# Patient Record
Sex: FEMALE | Race: WHITE | NOT HISPANIC OR LATINO | Employment: FULL TIME | URBAN - METROPOLITAN AREA
[De-identification: names, ages, dates, MRNs, and addresses within clinical notes are randomized per-mention and may not be internally consistent; named-entity substitution may affect disease eponyms.]

---

## 2022-12-02 ENCOUNTER — TRANSFERRED RECORDS (OUTPATIENT)
Dept: MULTI SPECIALTY CLINIC | Facility: CLINIC | Age: 27
End: 2022-12-02

## 2022-12-02 LAB — PAP-ABSTRACT: NORMAL

## 2023-09-15 ENCOUNTER — TRANSFERRED RECORDS (OUTPATIENT)
Dept: HEALTH INFORMATION MANAGEMENT | Facility: CLINIC | Age: 28
End: 2023-09-15

## 2024-05-19 ENCOUNTER — HEALTH MAINTENANCE LETTER (OUTPATIENT)
Age: 29
End: 2024-05-19

## 2024-06-04 SDOH — HEALTH STABILITY: PHYSICAL HEALTH: ON AVERAGE, HOW MANY DAYS PER WEEK DO YOU ENGAGE IN MODERATE TO STRENUOUS EXERCISE (LIKE A BRISK WALK)?: 1 DAY

## 2024-06-04 SDOH — HEALTH STABILITY: PHYSICAL HEALTH: ON AVERAGE, HOW MANY MINUTES DO YOU ENGAGE IN EXERCISE AT THIS LEVEL?: 20 MIN

## 2024-06-04 ASSESSMENT — SOCIAL DETERMINANTS OF HEALTH (SDOH): HOW OFTEN DO YOU GET TOGETHER WITH FRIENDS OR RELATIVES?: ONCE A WEEK

## 2024-06-05 ENCOUNTER — OFFICE VISIT (OUTPATIENT)
Dept: PEDIATRICS | Facility: CLINIC | Age: 29
End: 2024-06-05
Payer: COMMERCIAL

## 2024-06-05 VITALS
HEART RATE: 89 BPM | RESPIRATION RATE: 16 BRPM | BODY MASS INDEX: 24.1 KG/M2 | WEIGHT: 159 LBS | SYSTOLIC BLOOD PRESSURE: 108 MMHG | DIASTOLIC BLOOD PRESSURE: 60 MMHG | HEIGHT: 68 IN | OXYGEN SATURATION: 99 % | TEMPERATURE: 97.2 F

## 2024-06-05 DIAGNOSIS — F90.0 ATTENTION DEFICIT HYPERACTIVITY DISORDER (ADHD), PREDOMINANTLY INATTENTIVE TYPE: ICD-10-CM

## 2024-06-05 DIAGNOSIS — F32.A DEPRESSION, UNSPECIFIED DEPRESSION TYPE: ICD-10-CM

## 2024-06-05 DIAGNOSIS — Z00.00 ENCOUNTER FOR PREVENTATIVE ADULT HEALTH CARE EXAMINATION: Primary | ICD-10-CM

## 2024-06-05 DIAGNOSIS — Z76.89 ENCOUNTER TO ESTABLISH CARE: ICD-10-CM

## 2024-06-05 DIAGNOSIS — F41.9 ANXIETY: ICD-10-CM

## 2024-06-05 DIAGNOSIS — Z11.59 NEED FOR HEPATITIS C SCREENING TEST: ICD-10-CM

## 2024-06-05 DIAGNOSIS — L70.0 ACNE VULGARIS: ICD-10-CM

## 2024-06-05 DIAGNOSIS — Z11.4 SCREENING FOR HIV (HUMAN IMMUNODEFICIENCY VIRUS): ICD-10-CM

## 2024-06-05 DIAGNOSIS — E03.9 HYPOTHYROIDISM, UNSPECIFIED TYPE: ICD-10-CM

## 2024-06-05 PROCEDURE — 99385 PREV VISIT NEW AGE 18-39: CPT | Performed by: NURSE PRACTITIONER

## 2024-06-05 PROCEDURE — 99214 OFFICE O/P EST MOD 30 MIN: CPT | Mod: 25 | Performed by: NURSE PRACTITIONER

## 2024-06-05 RX ORDER — SPIRONOLACTONE 50 MG/1
50 TABLET, FILM COATED ORAL 2 TIMES DAILY
COMMUNITY
Start: 2022-09-15

## 2024-06-05 RX ORDER — ESCITALOPRAM OXALATE 10 MG/1
10 TABLET ORAL DAILY
Qty: 90 TABLET | Refills: 3 | Status: SHIPPED | OUTPATIENT
Start: 2024-06-05 | End: 2024-08-15

## 2024-06-05 RX ORDER — ESCITALOPRAM OXALATE 10 MG/1
10 TABLET ORAL DAILY
COMMUNITY
End: 2024-06-05

## 2024-06-05 RX ORDER — DESOGESTREL AND ETHINYL ESTRADIOL 0.15-0.03
1 KIT ORAL DAILY
COMMUNITY
End: 2024-06-05

## 2024-06-05 RX ORDER — DESOGESTREL AND ETHINYL ESTRADIOL 0.15-0.03
1 KIT ORAL DAILY
Qty: 84 TABLET | Refills: 3 | Status: SHIPPED | OUTPATIENT
Start: 2024-06-05 | End: 2024-06-13

## 2024-06-05 RX ORDER — DESOGESTREL AND ETHINYL ESTRADIOL 0.15-0.03
1 KIT ORAL DAILY
Qty: 84 TABLET | Refills: 3 | Status: SHIPPED | OUTPATIENT
Start: 2024-06-05 | End: 2024-06-05

## 2024-06-05 ASSESSMENT — PAIN SCALES - GENERAL: PAINLEVEL: NO PAIN (0)

## 2024-06-05 NOTE — PROGRESS NOTES
Preventive Care Visit  [facility] JEREMÍAS Farr CNP, Family Medicine  Jun 5, 2024      Assessment & Plan     Encounter for preventative adult health care examination  Age appropriate screening and preventative care have been addressed today. Vaccinations have been reviewed, she will return at later date for the COVID-19 vaccine. Patient has been advised to follow a balanced diet. They have been advised to undertake routine aerobic activity. Recommend annual vision exams as well as biannual dental exams. They will follow up for annual physical again in one year.   - Lipid panel reflex to direct LDL Fasting; Future  - Glucose; Future  - Pap utd, done through [facility] in 2022, results normal.     Encounter to establish care  Histories and medications reviewed and updated.   - OFFICE/OUTPT VISIT,EST,LEVL IV     Screening for HIV (human immunodeficiency virus)  - HIV Antigen Antibody Combo; Future    Need for hepatitis C screening test  - Hepatitis C Screen Reflex to HCV RNA Quant and Genotype; Future    Hypothyroidism, unspecified type  Off medication since January. Recheck TSH/T4 today and start medication as indicated. Prior dose was 75 mcg daily.   - TSH with free T4 reflex; Future  - OFFICE/OUTPT VISIT,EST,LEVL IV     Acne vulgaris  Chronic, stable. Taking combined oral contraceptives and spironolactone for management, spironolactone prescribed by derm.   - desogestrel-ethinyl estradiol (APRI) 0.15-30 MG-MCG tablet; Take 1 tablet by mouth daily  - OFFICE/OUTPT VISIT,EST,LEVL IV     Anxiety  Depression, unspecified depression type  Chronic, stable. Taking medication as prescribed. Issues with sleep, trouble initiating sleep, mind rolls and hard time turning off thoughts.   - escitalopram (LEXAPRO) 10 MG tablet; Take 1 tablet (10 mg) by mouth daily  - OFFICE/OUTPT VISIT,EST,LEVL IV     Attention deficit hyperactivity disorder (ADHD), predominantly inattentive type  ADHD testing done  9/2023, brought in copy of her assessment. Isn't sure if she would like to start medications but interested to learn more. Will have her return for virtual visit.   - OFFICE/OUTPT VISIT,CHERELLE KENNEY IV             Counseling  Appropriate preventive services were discussed with this patient, including applicable screening as appropriate for fall prevention, nutrition, physical activity, Tobacco-use cessation, weight loss and cognition.  Checklist reviewing preventive services available has been given to the patient.  Reviewed patient's diet, addressing concerns and/or questions.   She is at risk for lack of exercise and has been provided with information to increase physical activity for the benefit of her well-being.   The patient was instructed to see the dentist every 6 months.       Zoe Yoon is a 28 year old, presenting for the following:  Physical        6/5/2024     7:53 AM   Additional Questions   Roomed by Renee JEREZ CMA   Accompanied by Self         6/5/2024     7:53 AM   Patient Reported Additional Medications   Patient reports taking the following new medications n/a        Health Care Directive  Patient does not have a Health Care Directive or Living Will: Discussed advance care planning with patient; however, patient declined at this time.    HPI      Was on levothyroxine until January of this year, then stopped because she ran out. Insurance wouldn't allow her to go back to [place]. Was on levothyroxine 75 mcg daily. Hasn't had labs checked since 12/2022. Always tired. Naturally runs anxious. On lexapro.     Anxiety - on lexapro and seeing therapist every 2 weeks.     Spironolactone through derm for acne.     WILLIAMS, started for acne. Takes continuously.     Pap - done though [facility] 12/2022, results normal.     ADHD testing done 9/2023. Isn't sure if she would like to start medications but interested to learn more.         6/4/2024   General Health   How would you rate your overall physical  health? (!) FAIR   Feel stress (tense, anxious, or unable to sleep) To some extent   (!) STRESS CONCERN      6/4/2024   Nutrition   Three or more servings of calcium each day? (!) NO   Diet: Other   If other, please elaborate: Lactose Intolerant so I limit dairy   How many servings of fruit and vegetables per day? (!) 0-1   How many sweetened beverages each day? 0-1         6/4/2024   Exercise   Days per week of moderate/strenous exercise 1 day   Average minutes spent exercising at this level 20 min   (!) EXERCISE CONCERN      6/4/2024   Social Factors   Frequency of gathering with friends or relatives Once a week   Worry food won't last until get money to buy more No   Food not last or not have enough money for food? No   Do you have housing?  Yes   Are you worried about losing your housing? No   Lack of transportation? No   Unable to get utilities (heat,electricity)? No         6/4/2024   Dental   Dentist two times every year? (!) NO         6/4/2024   TB Screening   Were you born outside of the [place]? No         Today's PHQ-2 Score:       6/4/2024     3:41 PM   PHQ-2 ( 1999 [employer])   Q1: Little interest or pleasure in doing things 1   Q2: Feeling down, depressed or hopeless 1   PHQ-2 Score 2   Q1: Little interest or pleasure in doing things Several days   Q2: Feeling down, depressed or hopeless Several days   PHQ-2 Score 2           6/4/2024   Substance Use   Alcohol more than 3/day or more than 7/wk No   Do you use any other substances recreationally? No     Social History     Tobacco Use    Smoking status: Never     Passive exposure: Never    Smokeless tobacco: Never   Vaping Use    Vaping status: Never Used   Substance Use Topics    Alcohol use: Not Currently     Comment: If I had to guess I have a drink maybe every 3 months or so.    Drug use: Never          Mammogram Screening - Patient under 40 years of age: Routine Mammogram Screening not recommended.           6/4/2024   One time HIV Screening   Previous HIV  "test? No         6/4/2024   STI Screening   New sexual partner(s) since last STI/HIV test? No     History of abnormal Pap smear: No - age 21-29 PAP every 3 years recommended             6/4/2024   Contraception/Family Planning   Questions about contraception or family planning No        Reviewed and updated as needed this visit by Provider                    There is no problem list on file for this patient.    Past Surgical History:   Procedure Laterality Date    MOUTH SURGERY         Social History     Tobacco Use    Smoking status: Never     Passive exposure: Never    Smokeless tobacco: Never   Substance Use Topics    Alcohol use: Not Currently     Comment: If I had to guess I have a drink maybe every 3 months or so.     Family History   Problem Relation Age of Onset    Anxiety Disorder Mother     Hypertension Father     Substance Abuse Father     Diabetes Maternal Grandfather     Cerebrovascular Disease Maternal Grandfather     Other Cancer Maternal Grandfather     Thyroid Disease Maternal Grandmother     Anxiety Disorder Sister                 Objective    Exam  /60   Pulse 89   Temp 97.2  F (36.2  C) (Tympanic)   Resp 16   Ht 1.715 m (5' 7.5\")   Wt 72.1 kg (159 lb)   LMP 02/29/2024 (Approximate)   SpO2 99%   BMI 24.54 kg/m     Estimated body mass index is 24.54 kg/m  as calculated from the following:    Height as of this encounter: 1.715 m (5' 7.5\").    Weight as of this encounter: 72.1 kg (159 lb).    Physical Exam  Constitutional: appears to be in no acute distress, comfortable, pleasant.   Eyes: anicteric, conjunctiva clear without drainage or erythema. J CARLOS.   Ears, Nose and Throat: tympanic membranes gray with LR,  nose without nasal discharge. OP: no erythema to posterior pharynx, negative post nasal drainage, tonsils +1 no erythema or exudate.  Neck: supple, thyroid palpable,not enlarged, no nodules   Breast: Exam deferred (deferred after discussion of exam options with patient, no " symptoms or concerns).   Cardiovascular: regular rate and rhythm, normal S1 and S2, no murmurs, rubs or gallops, peripheral pulses full and symmetric; negative peripheral edema   Respiratory: Air entry throughout. Breathing pattern unlabored without the use of accessory muscles. Clear to auscultation A and P, no wheezes or crackles, normal breath sounds.    Gastrointestinal: rounded, soft. Positive bowel sounds x4, nontender, no masses.   Genitourinary: Exam deferred (deferred after discussion of exam options with patient, no symptoms or concerns, pap is up to date).   Musculoskeletal: full range of motion, no edema.   Skin: pink, turgor smooth and elastic. Negative for lesions or dryness.  Neurological: normal gait, no tremor.   Psychological: appropriate mood and affect.   Lymphatic: no cervical, axillary, supraclavicular, or infraclavicular lymphadenopathy.    Signed Electronically by: JEREMÍAS Leone CNP

## 2024-06-13 ENCOUNTER — TELEPHONE (OUTPATIENT)
Dept: PEDIATRICS | Facility: CLINIC | Age: 29
End: 2024-06-13

## 2024-06-13 ENCOUNTER — VIRTUAL VISIT (OUTPATIENT)
Dept: PEDIATRICS | Facility: CLINIC | Age: 29
End: 2024-06-13
Payer: COMMERCIAL

## 2024-06-13 ENCOUNTER — MYC MEDICAL ADVICE (OUTPATIENT)
Dept: PEDIATRICS | Facility: CLINIC | Age: 29
End: 2024-06-13

## 2024-06-13 DIAGNOSIS — F90.0 ATTENTION DEFICIT HYPERACTIVITY DISORDER (ADHD), PREDOMINANTLY INATTENTIVE TYPE: Primary | ICD-10-CM

## 2024-06-13 DIAGNOSIS — L70.0 ACNE VULGARIS: ICD-10-CM

## 2024-06-13 DIAGNOSIS — E03.8 SUBCLINICAL HYPOTHYROIDISM: Primary | ICD-10-CM

## 2024-06-13 PROCEDURE — 99214 OFFICE O/P EST MOD 30 MIN: CPT | Mod: 95 | Performed by: NURSE PRACTITIONER

## 2024-06-13 RX ORDER — DESOGESTREL AND ETHINYL ESTRADIOL 0.15-0.03
1 KIT ORAL DAILY
Qty: 84 TABLET | Refills: 3 | Status: SHIPPED | OUTPATIENT
Start: 2024-06-13

## 2024-06-13 RX ORDER — DEXTROAMPHETAMINE SACCHARATE, AMPHETAMINE ASPARTATE MONOHYDRATE, DEXTROAMPHETAMINE SULFATE AND AMPHETAMINE SULFATE 2.5; 2.5; 2.5; 2.5 MG/1; MG/1; MG/1; MG/1
10 CAPSULE, EXTENDED RELEASE ORAL DAILY
Qty: 30 CAPSULE | Refills: 0 | Status: SHIPPED | OUTPATIENT
Start: 2024-06-13 | End: 2024-07-18

## 2024-06-13 NOTE — TELEPHONE ENCOUNTER
"From Melissa Geiger APRN CNP  \"Hi! Could you please call her pharmacy and figure out what how I need to write her birth control prescription in order for her to get a refill? She takes it continuously so runs out sooner and they won't give her a refill for another 2 weeks. She is out of medication. Do I need to change the quantity? I wrote them a note with her last refill saying it was okay to fill early and they still wouldn't dispense it. Let me know what you find out. Thanks!\"     RN called and spoke with pharmacy staff. Pharmacy states she can fill tomorrow with insurance coverage.   Pharmacy states that someone can call insurance and inform them that patient skips her placebo week of medication so she takes continuously, that is why she needs early refills- if script is changed- we should not have to contact insurance.    RN attempted to reach patient to inform her prescription can be picked up tomorrow. LVMTCB and speak with nurse Torres-  Khari HERNANDEZ; should be written \"Take 1 tablet by mouth daily continuously, skip placebo week. \"  Write for 3 packs (or 84 tablets) and pharmacy can then adjust the amount of days the prescription is written for for insurance purpose.    I think you also should be able to adjust the quantity to 112- whichever you prefer!    Hope this helps!  Anita HUNTER RN on 6/13/2024 at 12:41 PM     "

## 2024-06-13 NOTE — PROGRESS NOTES
Car is a 28 year old who is being evaluated via a billable video visit.          Assessment & Plan     Attention deficit hyperactivity disorder (ADHD), predominantly inattentive type  Start Adderall XR 10 mg daily in the morning. Opted out of afternoon booster dose at this time, will consider in the future if needed. Follow-up in 1 month, in person so that we can complete a controlled substance agreement and urine test.  - amphetamine-dextroamphetamine (ADDERALL XR) 10 MG 24 hr capsule; Take 1 capsule (10 mg) by mouth daily                Subjective   Car is a 28 year old, presenting for the following health issues:  A.D.H.D    HPI     Presents today for ADHD follow-up.     ADHD testing completed 9/2023 and confirmed a diagnosis of ADHD, predominantly inattentive type. A copy of this assessment has been scanned into her chart.     She is interested in learning more about treatment options, specifically pharmacologic management options. She is currently following with her therapist every 2 weeks.         Objective       Vitals:  No vitals were obtained today due to virtual visit.    Physical Exam   GENERAL: alert and no distress  EYES: Eyes grossly normal to inspection.  No discharge or erythema, or obvious scleral/conjunctival abnormalities.  RESP: No audible wheeze, cough, or visible cyanosis.    SKIN: Visible skin clear. No significant rash, abnormal pigmentation or lesions.  NEURO: Cranial nerves grossly intact.  Mentation and speech appropriate for age.  PSYCH: Appropriate affect, tone, and pace of words        Video-Visit Details    Type of service:  Video Visit   Originating Location (pt. Location): Home    Distant Location (provider location):  Off-site  Platform used for Video Visit: Prabhjot  Signed Electronically by: JEREMÍAS Leone CNP

## 2024-06-13 NOTE — Clinical Note
Hi! Could you please call her pharmacy and figure out what how I need to write her birth control prescription in order for her to get a refill? She takes it continuously so runs out sooner and they won't give her a refill for another 2 weeks. She is out of medication. Do I need to change the quantity? I wrote them a note with her last refill saying it was okay to fill early and they still wouldn't dispense it. Let me know what you find out. Thanks!

## 2024-06-14 ENCOUNTER — LAB (OUTPATIENT)
Dept: LAB | Facility: CLINIC | Age: 29
End: 2024-06-14
Payer: COMMERCIAL

## 2024-06-14 DIAGNOSIS — Z00.00 ENCOUNTER FOR PREVENTATIVE ADULT HEALTH CARE EXAMINATION: ICD-10-CM

## 2024-06-14 DIAGNOSIS — E03.8 SUBCLINICAL HYPOTHYROIDISM: ICD-10-CM

## 2024-06-14 DIAGNOSIS — E03.9 HYPOTHYROIDISM, UNSPECIFIED TYPE: ICD-10-CM

## 2024-06-14 DIAGNOSIS — Z11.59 NEED FOR HEPATITIS C SCREENING TEST: ICD-10-CM

## 2024-06-14 DIAGNOSIS — Z11.4 SCREENING FOR HIV (HUMAN IMMUNODEFICIENCY VIRUS): ICD-10-CM

## 2024-06-14 LAB
CHOLEST SERPL-MCNC: 267 MG/DL
FASTING STATUS PATIENT QL REPORTED: ABNORMAL
FASTING STATUS PATIENT QL REPORTED: NORMAL
GLUCOSE SERPL-MCNC: 77 MG/DL (ref 70–99)
HCV AB SERPL QL IA: NONREACTIVE
HDLC SERPL-MCNC: 59 MG/DL
HIV 1+2 AB+HIV1 P24 AG SERPL QL IA: NONREACTIVE
LDLC SERPL CALC-MCNC: 187 MG/DL
NONHDLC SERPL-MCNC: 208 MG/DL
T4 FREE SERPL-MCNC: 1.04 NG/DL (ref 0.9–1.7)
TRIGL SERPL-MCNC: 107 MG/DL
TSH SERPL DL<=0.005 MIU/L-ACNC: 6.04 UIU/ML (ref 0.3–4.2)

## 2024-06-14 PROCEDURE — 82947 ASSAY GLUCOSE BLOOD QUANT: CPT

## 2024-06-14 PROCEDURE — 84443 ASSAY THYROID STIM HORMONE: CPT

## 2024-06-14 PROCEDURE — 87389 HIV-1 AG W/HIV-1&-2 AB AG IA: CPT

## 2024-06-14 PROCEDURE — 86803 HEPATITIS C AB TEST: CPT

## 2024-06-14 PROCEDURE — 36415 COLL VENOUS BLD VENIPUNCTURE: CPT

## 2024-06-14 PROCEDURE — 86376 MICROSOMAL ANTIBODY EACH: CPT

## 2024-06-14 PROCEDURE — 80061 LIPID PANEL: CPT

## 2024-06-14 PROCEDURE — 84439 ASSAY OF FREE THYROXINE: CPT

## 2024-06-18 LAB — THYROPEROXIDASE AB SERPL-ACNC: 14 IU/ML

## 2024-06-19 DIAGNOSIS — E03.8 SUBCLINICAL HYPOTHYROIDISM: Primary | ICD-10-CM

## 2024-06-20 ENCOUNTER — MYC MEDICAL ADVICE (OUTPATIENT)
Dept: PEDIATRICS | Facility: CLINIC | Age: 29
End: 2024-06-20
Payer: COMMERCIAL

## 2024-06-21 NOTE — TELEPHONE ENCOUNTER
"See patient's BTC Tript message   - Patient wondering if she can travel through [facility]/airport with her Adderall medication     Sent a BTC Tript message to the patient   - Informed the patient of the [facility] website information stating:     \"Can you pack your meds in a pill case and more questions answered  One of the more popular questions we get from travelers is:  Can I travel with my medication?  The answer is yes, with some qualifiers. Here are a few tips that you might find helpful.    It is not necessary to present your medication to, or notify an officer about any medication you are traveling with unless it is in liquid form (See next bullet).  Medication in liquid form is allowed in carry-on bags in excess of 3.4 ounces in reasonable quantities for the flight. It is not necessary to place medically required liquids in a zip-top bag. However, you must tell the officer that you have medically necessary liquids at the start of the screening checkpoint process. Medically required liquids will be subject to additional screening that could include being asked to open the container.  You can bring your medication in pill or solid form in unlimited amounts as long as it is screened.  You can travel with your medication in both carry-on and checked baggage. It s highly recommended you place these items in your carry-on in the event that you need immediate access.  [facility] does not require passengers to have medications in prescription bottles, but states have individual laws regarding the labeling of prescription medication with which passengers need to comply.  Medication is usually screened by X-ray; however, if a passenger does not want a medication X-rayed, he or she may ask for a visual inspection instead. This request must be made before any items are sent through the X-ray tunnel.  Nitroglycerin tablets and spray (used to treat episodes of angina in people who have coronary artery disease) are permitted and have never been " "prohibited.\"     Eliza SAMUEL RN   [facility] [place]   "

## 2024-07-18 ENCOUNTER — VIRTUAL VISIT (OUTPATIENT)
Dept: PEDIATRICS | Facility: CLINIC | Age: 29
End: 2024-07-18
Payer: COMMERCIAL

## 2024-07-18 DIAGNOSIS — F90.0 ATTENTION DEFICIT HYPERACTIVITY DISORDER (ADHD), PREDOMINANTLY INATTENTIVE TYPE: Primary | ICD-10-CM

## 2024-07-18 PROCEDURE — 99214 OFFICE O/P EST MOD 30 MIN: CPT | Mod: 95 | Performed by: NURSE PRACTITIONER

## 2024-07-18 PROCEDURE — G2211 COMPLEX E/M VISIT ADD ON: HCPCS | Mod: 95 | Performed by: NURSE PRACTITIONER

## 2024-07-18 RX ORDER — DEXTROAMPHETAMINE SACCHARATE, AMPHETAMINE ASPARTATE MONOHYDRATE, DEXTROAMPHETAMINE SULFATE AND AMPHETAMINE SULFATE 3.75; 3.75; 3.75; 3.75 MG/1; MG/1; MG/1; MG/1
15 CAPSULE, EXTENDED RELEASE ORAL DAILY
Qty: 30 CAPSULE | Refills: 0 | Status: SHIPPED | OUTPATIENT
Start: 2024-07-18 | End: 2024-08-15

## 2024-07-18 NOTE — LETTER
July 18, 2024      Car Luis  [address] [place] [address]        To Whom It May Concern,     Car Luis is under my care. She has an active prescription for Adderall XR and it can be expected that she will be traveling internationally with this medication on hand given she is instructed to take this every morning as prescribed.     Please contact me with questions or concerns.       Sincerely,    JEREMÍAS Leone CNP

## 2024-07-18 NOTE — PROGRESS NOTES
Car is a 29 year old who is being evaluated via a billable video visit.          Assessment & Plan     Attention deficit hyperactivity disorder (ADHD), predominantly inattentive type  No improvement after starting Adderall XR 10 mg daily about a month ago. Plan to increase to Adderall XR 15 mg daily with vrt follow-up again in 1 month, sooner if she has concerns.   - amphetamine-dextroamphetamine (ADDERALL XR) 15 MG 24 hr capsule; Take 1 capsule (15 mg) by mouth daily  [[id number]] ND COMPLEX E & M VISIT, PART OF ON-GOING CARE (ADD ON)     The longitudinal plan of care for the diagnosis(es)/condition(s) as documented were addressed during this visit. Due to the added complexity in care, I will continue to support Car in the subsequent management and with ongoing continuity of care.      Subjective   Car is a 29 year old, presenting for the following health issues:  EARNESTINE    HPI     Presents today for ADHD follow-up.     Started Adderall XR 10 mg daily about a month ago. She is taking the medication as prescribed. She denies side effects. She hasn't noticed any difference in her ADHD symptoms since starting the Adderall.         Objective       Vitals:  No vitals were obtained today due to virtual visit.    Physical Exam   GENERAL: alert and no distress  EYES: Eyes grossly normal to inspection.  No discharge or erythema, or obvious scleral/conjunctival abnormalities.  RESP: No audible wheeze, cough, or visible cyanosis.    SKIN: Visible skin clear. No significant rash, abnormal pigmentation or lesions.  NEURO: Cranial nerves grossly intact.  Mentation and speech appropriate for age.  PSYCH: Appropriate affect, tone, and pace of words          Video-Visit Details    Type of service:  Video Visit   Originating Location (pt. Location): Home    Distant Location (provider location):  Off-site  Platform used for Video Visit: Prabhjot  Signed Electronically by: JEREMÍAS Leone CNP

## 2024-08-14 ENCOUNTER — MYC REFILL (OUTPATIENT)
Dept: PEDIATRICS | Facility: CLINIC | Age: 29
End: 2024-08-14
Payer: COMMERCIAL

## 2024-08-14 DIAGNOSIS — F41.9 ANXIETY: ICD-10-CM

## 2024-08-14 DIAGNOSIS — F32.A DEPRESSION, UNSPECIFIED DEPRESSION TYPE: ICD-10-CM

## 2024-08-14 RX ORDER — ESCITALOPRAM OXALATE 10 MG/1
10 TABLET ORAL DAILY
Qty: 90 TABLET | Refills: 3 | OUTPATIENT
Start: 2024-08-14

## 2024-08-15 ENCOUNTER — VIRTUAL VISIT (OUTPATIENT)
Dept: PEDIATRICS | Facility: CLINIC | Age: 29
End: 2024-08-15
Payer: COMMERCIAL

## 2024-08-15 DIAGNOSIS — F41.9 ANXIETY: ICD-10-CM

## 2024-08-15 DIAGNOSIS — F32.A DEPRESSION, UNSPECIFIED DEPRESSION TYPE: ICD-10-CM

## 2024-08-15 DIAGNOSIS — F90.0 ATTENTION DEFICIT HYPERACTIVITY DISORDER (ADHD), PREDOMINANTLY INATTENTIVE TYPE: Primary | ICD-10-CM

## 2024-08-15 PROCEDURE — 99214 OFFICE O/P EST MOD 30 MIN: CPT | Mod: 95 | Performed by: NURSE PRACTITIONER

## 2024-08-15 PROCEDURE — G2211 COMPLEX E/M VISIT ADD ON: HCPCS | Mod: 95 | Performed by: NURSE PRACTITIONER

## 2024-08-15 RX ORDER — ESCITALOPRAM OXALATE 10 MG/1
10 TABLET ORAL DAILY
Qty: 90 TABLET | Refills: 3 | Status: SHIPPED | OUTPATIENT
Start: 2024-08-15

## 2024-08-15 RX ORDER — DEXTROAMPHETAMINE SACCHARATE, AMPHETAMINE ASPARTATE MONOHYDRATE, DEXTROAMPHETAMINE SULFATE AND AMPHETAMINE SULFATE 5; 5; 5; 5 MG/1; MG/1; MG/1; MG/1
20 CAPSULE, EXTENDED RELEASE ORAL DAILY
Qty: 30 CAPSULE | Refills: 0 | Status: SHIPPED | OUTPATIENT
Start: 2024-08-15 | End: 2024-09-26

## 2024-08-15 NOTE — PROGRESS NOTES
Car is a 29 year old who is being evaluated via a billable video visit.          Assessment & Plan     Attention deficit hyperactivity disorder (ADHD), predominantly inattentive type  No change. Increase to Adderall XR 20 mg daily with vrt follow-up again in 1 month. Future considerations: switch to IR formulation or switch to alternate stimulant.   - amphetamine-dextroamphetamine (ADDERALL XR) 20 MG 24 hr capsule; Take 1 capsule (20 mg) by mouth daily    Anxiety  Depression, unspecified depression type  Having issues getting her lexapro from the pharmacy so the prescription was re-sent. No concerns with regard to anxiety or depression.   - escitalopram (LEXAPRO) 10 MG tablet; Take 1 tablet (10 mg) by mouth daily    The longitudinal plan of care for the diagnosis(es)/condition(s) as documented were addressed during this visit. Due to the added complexity in care, I will continue to support Car in the subsequent management and with ongoing continuity of care.      Subjective   Car is a 29 year old, presenting for the following health issues:  Recheck Medication    HPI     Presents today for medication recheck.     Last OV 7/18/2024:  Attention deficit hyperactivity disorder (ADHD), predominantly inattentive type  No improvement after starting Adderall XR 10 mg daily about a month ago. Plan to increase to Adderall XR 15 mg daily with vrt follow-up again in 1 month, sooner if she has concerns.     Has still not noticed a difference in her ADHD symptoms on the 15 mg dose. She is taking the medication 7 days a week, admits to forgetting 1x/week on some weeks.         Objective       Vitals:  No vitals were obtained today due to virtual visit.    Physical Exam   GENERAL: alert and no distress  EYES: Eyes grossly normal to inspection.  No discharge or erythema, or obvious scleral/conjunctival abnormalities.  RESP: No audible wheeze, cough, or visible cyanosis.    SKIN: Visible skin clear. No significant rash,  abnormal pigmentation or lesions.  NEURO: Cranial nerves grossly intact.  Mentation and speech appropriate for age.  PSYCH: Appropriate affect, tone, and pace of words        Video-Visit Details    Type of service:  Video Visit   Originating Location (pt. Location): Home    Distant Location (provider location):  Off-site  Platform used for Video Visit: Prabhjot  Signed Electronically by: JEREMÍAS Leone CNP

## 2024-08-19 ENCOUNTER — MYC MEDICAL ADVICE (OUTPATIENT)
Dept: PEDIATRICS | Facility: CLINIC | Age: 29
End: 2024-08-19
Payer: COMMERCIAL

## 2024-08-20 NOTE — TELEPHONE ENCOUNTER
"Please review mychart message. Patient has concerns about effects of Adderall after dose increase.    \"I have been feeling super out of it, in a haze almost.\"    Patient inquiring if she should go back down to the 15mg dose.    Attention deficit hyperactivity disorder (ADHD), predominantly inattentive type  No change. Increase to Adderall XR 20 mg daily with vrt follow-up again in 1 month. Future considerations: switch to IR formulation or switch to alternate stimulant.   - amphetamine-dextroamphetamine (ADDERALL XR) 20 MG 24 hr capsule; Take 1 capsule (20 mg) by mouth daily    Please advise.  Thanks!  Ruth NOLAN RN, BSN             "

## 2024-09-26 ENCOUNTER — VIRTUAL VISIT (OUTPATIENT)
Dept: PEDIATRICS | Facility: CLINIC | Age: 29
End: 2024-09-26
Payer: COMMERCIAL

## 2024-09-26 DIAGNOSIS — F90.0 ATTENTION DEFICIT HYPERACTIVITY DISORDER (ADHD), PREDOMINANTLY INATTENTIVE TYPE: Primary | ICD-10-CM

## 2024-09-26 DIAGNOSIS — F41.1 GAD (GENERALIZED ANXIETY DISORDER): ICD-10-CM

## 2024-09-26 PROCEDURE — 99207 E-CONSULT TO PSYCHIATRY (ADULT OUTPT PROVIDER TO SPECIALIST WRITTEN QUESTION & RESPONSE): CPT | Mod: 95 | Performed by: NURSE PRACTITIONER

## 2024-09-26 PROCEDURE — G2211 COMPLEX E/M VISIT ADD ON: HCPCS | Mod: 95 | Performed by: NURSE PRACTITIONER

## 2024-09-26 PROCEDURE — 99214 OFFICE O/P EST MOD 30 MIN: CPT | Mod: 95 | Performed by: NURSE PRACTITIONER

## 2024-09-26 NOTE — PROGRESS NOTES
Car is a 29 year old who is being evaluated via a billable video visit.          Assessment & Plan     Attention deficit hyperactivity disorder (ADHD), predominantly inattentive type  TOBY (generalized anxiety disorder)  Unfortunately patient did not tolerating Adderall XR 20 mg due to side effects and stopped the medication on 9/13/24. Psychiatry e-consult submitted for guidance on recommendations for what to try next. Will be in touch with patient over [employer] when I hear back. She is leaving for vacation the next 3 weeks, will re-visit the plan following her return home.   - Adult E-Consult to Psychiatry (Outpt Provider to Specialist Written Question & Response)    The longitudinal plan of care for the diagnosis(es)/condition(s) as documented were addressed during this visit. Due to the added complexity in care, I will continue to support aCr in the subsequent management and with ongoing continuity of care.      Subjective   Car is a 29 year old, presenting for the following health issues:  Recheck Medication    HPI     Presents for ADHD follow-up and medication recheck.     Last OV one month ago, increased from Adderall XR 15 mg to Adderall XR 20 mg. Just after increasing the medication, she felt like she was in a haze, everything was slow motion. Made working difficult. She isn't sure if this feeling was from the medication but that was the only thing she could think of that had changed. This feeling lasted for one day and then resolved. She continued on the 20 mg dose and that feeling didn't return.     The following two weeks she was feeling like her heart was beating fast, could feel it in her neck. She is a coffee drinker, so she was thinking that might have contributed. Tried to cut back a little and even cut out caffeine for a week but then had caffeine withdrawal headaches. Even off caffeine, she continued to have the symptoms. Her smartwatch was telling her resting HR was 100+ bpm, had  tremors, was not sleeping, up until 1-2 am. Constant thirst. She then stopped the medication. Last dose 9/13.      Objective       Vitals:  No vitals were obtained today due to virtual visit.    Physical Exam   GENERAL: alert and no distress  EYES: Eyes grossly normal to inspection.  No discharge or erythema, or obvious scleral/conjunctival abnormalities.  RESP: No audible wheeze, cough, or visible cyanosis.    SKIN: Visible skin clear. No significant rash, abnormal pigmentation or lesions.  NEURO: Cranial nerves grossly intact.  Mentation and speech appropriate for age.  PSYCH: Appropriate affect, tone, and pace of words          Video-Visit Details    Type of service:  Video Visit   Originating Location (pt. Location): Home    Distant Location (provider location):  Off-site  Platform used for Video Visit: Prabhjot  Signed Electronically by: JEREMÍAS Leone CNP

## 2024-09-27 ENCOUNTER — E-CONSULT (OUTPATIENT)
Dept: PSYCHIATRY | Facility: CLINIC | Age: 29
End: 2024-09-27
Payer: COMMERCIAL

## 2024-09-27 PROCEDURE — 99451 NTRPROF PH1/NTRNET/EHR 5/>: CPT | Performed by: PSYCHIATRY & NEUROLOGY

## 2024-09-27 NOTE — PROGRESS NOTES
9/27/2024     E-Consult has been accepted.    Interprofessional consultation requested by:  Melissa Geiger APRN CNP      Clinical Question/Purpose: MY CLINICAL QUESTION IS: Hello! This patient has been formally diagnosed with ADHD and we recently tried a medication for the first time. We went with Adderall XR, started on 10 mg daily and then each month increased until most recently she was taking 20 mg daily. Unfortunately she experienced bothersome side effects on this dose and stopped taking the medication. The lower doses of Adderall were not effective for symptom management. I wonder what you would recommend next. She takes lexapro 10 mg daily for management of anxiety. I am considering vyvanse or perhaps a non-stimulant option like atomoxetine but wasn't sure if her being on an ssri would be a contraindication to atomoxetine. Thank you!    Patient assessment and information reviewed: chart review    Recommendations:  The side effects she had (increased HR, etc) do sound like it was from the adderall.  This is common for people to have to try several ones before they find the right one.  Frequently just changing the med makes a difference despite them all being stimulants.      Switching to vyvanse would make sense.  It is usually my go to with those with anxiety.  Vyvanse 10 mg once a day is a low starting dose, but usually best to start low to avoid side effects if we can.  Max is 70 mg.  It can be increased by 10-20 mg every few weeks if not working.      It is also okay to go to methylphenidate instead.  Again, it is not clear why, but sometimes people respond better to that than the adderall.  Methylphenidate has many different forms, both long acting and short acting.  Choosing one and starting that lowest dose would make sense.  Then increase as needed like you did with the adderall.    As far as using atomoxetine, it is safe to use with an selective serotonin reuptake inhibitor.  It can worsen  depression and cause some suicidal thoughts, but it is rare. In my experience, it is about 50/50 of patients who respond to it.  Those that respond, find it works very well.  This is just my anecdotal experience.  Amoxetine 20 mg once a day is the starting dose.  Usually I increase it to 40 mg after a week.  It can be daily or twice a day.  For some, it can cause some sleepiness, so they take it at night or divide the dose up to prevent that.  Max is 100 mg a day.      Other options that can be used include clonidine and tenex.  They are second line agents but can also help with anxiety.  They are equal in efficacy, but like adderall vs methylphenidate, sometimes people just respond better to one than the other.  The main side effect is sleepiness.  It can decrease blood pressure, so that needs to be monitored.  They are usually well tolerated.  Clonidine starts at 0.1 mg at bedtime (tenex 1 mg at bedtime) and after a week or two, it can be increased.  Max for clonidine is 0.4 mg (tenex is 4 mg) a day.  Both can be split into bid dosing if needed.    Thanks for the consult.            The recommendations provided in this E-Consult are based on a review of clinical data pertinent to the clinical question presented, without a review of the patient's complete medical record or, the benefit of a comprehensive in-person or virtual patient evaluation. This consultation should not replace the clinical judgement and evaluation of the provider ordering this E-Consult. Any new clinical issues, or changes in patient status since the filing of this E-Consult will need to be taken into account when assessing these recommendations. Please contact me if you have further questions.    My total time spent reviewing clinical information and formulating assessment was 15 minutes.        Aldo Puga MD

## 2025-05-08 ENCOUNTER — PATIENT OUTREACH (OUTPATIENT)
Dept: CARE COORDINATION | Facility: CLINIC | Age: 30
End: 2025-05-08
Payer: COMMERCIAL

## 2025-06-17 DIAGNOSIS — L70.0 ACNE VULGARIS: ICD-10-CM

## 2025-06-17 RX ORDER — DESOGESTREL AND ETHINYL ESTRADIOL 0.15-0.03
1 KIT ORAL DAILY
Qty: 84 TABLET | Refills: 0 | Status: SHIPPED | OUTPATIENT
Start: 2025-06-17

## 2025-06-27 ENCOUNTER — ANCILLARY PROCEDURE (OUTPATIENT)
Dept: GENERAL RADIOLOGY | Facility: CLINIC | Age: 30
End: 2025-06-27
Attending: NURSE PRACTITIONER
Payer: COMMERCIAL

## 2025-06-27 DIAGNOSIS — K59.00 CONSTIPATION, UNSPECIFIED CONSTIPATION TYPE: ICD-10-CM

## 2025-06-27 PROCEDURE — 74019 RADEX ABDOMEN 2 VIEWS: CPT | Mod: TC | Performed by: RADIOLOGY

## 2025-08-16 DIAGNOSIS — L70.0 ACNE VULGARIS: ICD-10-CM

## 2025-08-18 ENCOUNTER — MYC REFILL (OUTPATIENT)
Dept: PEDIATRICS | Facility: CLINIC | Age: 30
End: 2025-08-18
Payer: COMMERCIAL

## 2025-08-18 DIAGNOSIS — L70.0 ACNE VULGARIS: ICD-10-CM

## 2025-08-18 RX ORDER — DESOGESTREL AND ETHINYL ESTRADIOL 0.15-0.03
KIT ORAL
Qty: 112 TABLET | OUTPATIENT
Start: 2025-08-18

## 2025-08-18 RX ORDER — DESOGESTREL AND ETHINYL ESTRADIOL 0.15-0.03
KIT ORAL
Qty: 84 TABLET | Refills: 0 | OUTPATIENT
Start: 2025-08-18

## 2025-08-18 RX ORDER — DESOGESTREL AND ETHINYL ESTRADIOL 0.15-0.03
KIT ORAL
Qty: 84 TABLET | Refills: 0 | Status: SHIPPED | OUTPATIENT
Start: 2025-08-18